# Patient Record
Sex: FEMALE | Race: WHITE
[De-identification: names, ages, dates, MRNs, and addresses within clinical notes are randomized per-mention and may not be internally consistent; named-entity substitution may affect disease eponyms.]

---

## 2017-12-06 NOTE — RADRPT
EXAM DATE/TIME:  12/06/2017 16:17 

 

[facility] COMPARISON:     

No previous studies available for comparison.

 

                     

INDICATIONS :     

Abdominal pain and constipation.

                     

 

MEDICAL HISTORY :     

None.          

 

SURGICAL HISTORY :     

None.   

 

ENCOUNTER:     

Initial                                        

 

ACUITY:     

2 weeks      

 

PAIN SCORE:     

6/10

 

LOCATION:       

all quadrants.

 

FINDINGS:     

Supine and upright views of the abdomen were performed.  The abdominal bowel gas pattern is normal.  
No air fluid levels are seen.  No abnormal masses, calcifications, or organomegaly is seen.  The visu
alized lower lungs are clear.  No evidence of free intraperitoneal gas.  The osseous structures are u
nremarkable with a mild dextroscoliosis of the lumbar spine.

 

CONCLUSION:     

Radiographically benign abdomen.

 

 

 

 Chris Bishop MD on December 06, 2017 at 17:13           

Board Certified Radiologist.

 This report was verified electronically.

## 2017-12-06 NOTE — PD
HPI


Chief Complaint:  Cold / Flu Symptoms


Time Seen by Provider:  15:49


Travel History


International Travel<30 days:  No


Contact w/Intl Traveler<30days:  No


Traveled to known affect area:  No





History of Present Illness


HPI


32-year-old female presents to emergency department with multiple medical 

complaints today.  Patient states that she has "digestive issues" for about 3 

weeks and states her bowel habits have not been regular since then.  She is 

concerned because she also has some mild abdominal pain.  States that she had a 

"normal" bowel movement 1 week ago.  Also states that she had a bowel movement 

yesterday but did not feel like she can completely empty her bowels.  States 

she has had flatulence and burping.  Patient denies fever, chills, chest pain, 

shortness of breath.  Also states that she has had increased urination possibly 

secondary to the constipation.  Patient says that she has used MiraLAX and over-

the-counter stool softeners without relief.  Of note patient was vegan for 

several months and she also believes that this may be contributing to her 

constipation.  


She is also complaining of lower lumbar pain after moving that occurred about 1 

month ago  after moving.  She thinks she has a "bulging disc".  Her pain is 

mainly located in the lower lumbar spine. Patient denies numbness, tingling, 

saddle anesthesia, loss of bowel or bladder function, IV drug use, fever.


She is concerned about upper respiratory infection that she may have as well.  

Patient states she has a cough with clear mucus, mild sore throat, and 

rhinorrhea.  She denies nausea, vomiting, diarrhea





PFSH


Past Medical History


Asthma:  Yes


Diminished Hearing:  No


Musculoskeletal:  Yes (chronic neck and back pain)


Pregnant?:  Not Pregnant


:  1


Para:  1


Miscarriage:  0


:  0





Social History


Alcohol Use:  No


Tobacco Use:  No


Substance Use:  No





Allergies-Medications


(Allergen,Severity, Reaction):  


Coded Allergies:  


     No Known Allergies (Verified  Adverse Reaction, Unknown, 17)


Reported Meds & Prescriptions





Reported Meds & Active Scripts


Active


Macrobid (Nitrofurantoin Monoh/Nitrofur Macro) 100 Mg Cap 100 Mg PO BID 7 Days


Reported


Miralax Powder (Polyethylene Glycol 3350 Powder) 17 Gm Powd 17 Gm PO DAILY


     Mix and dissolve one measuring cap-ful (17 grams) in water or juice.


Alprazolam 0.5 Mg Tab 0.5 Mg PO Q8H PRN


Adderall Xr 24 HR (Amphetamine/Dextroamphetamine) 30 Mg Cap 30 Mg PO DAILY


     Once daily in the morning.








Review of Systems


Except as stated in HPI:  all other systems reviewed are Neg





Physical Exam


Narrative


GENERAL: Well developed well nourished in no apparent distress


SKIN: Focused skin assessment warm/dry.


HEAD: Atraumatic. Normocephalic. 


EYES: Pupils equal and round. No scleral icterus. No injection or drainage. 


ENT: No nasal bleeding or discharge.  Mucous membranes pink and moist.


NECK: Trachea midline. No JVD. 


CARDIOVASCULAR: Regular rate and rhythm.  No murmur appreciated.


RESPIRATORY: No accessory muscle use. Clear to auscultation. Breath sounds 

equal bilaterally. 


GASTROINTESTINAL: Abdomen soft, nondistended. Hepatic and splenic margins not 

palpable.  Abdomen diffusely tender without rebound tenderness or ecchymosis.


MUSCULOSKELETAL: No obvious deformities. No clubbing.  No cyanosis.  No edema.  

No midline tenderness.


NEUROLOGICAL: Awake and alert. No obvious cranial nerve deficits.  Motor 

grossly within normal limits. Normal speech.


PSYCHIATRIC: Appropriate mood and affect; insight and judgment normal.





Data


Data


Last Documented VS





Vital Signs








  Date Time  Temp Pulse Resp B/P (MAP) Pulse Ox O2 Delivery O2 Flow Rate FiO2


 


17 15:29 98.3 90 16 115/79 (91) 99   








Orders





 Orders


Urinalysis - C+S If Indicated (17 15:50)


Abdomen, Flat & Upright (17 )


Urine Culture (17 14:30)


Ed Discharge Order (17 17:29)





Labs





Laboratory Tests








Test


  17


14:30


 


Urine Color YELLOW 


 


Urine Turbidity SLIGHT 


 


Urine pH 6.0 


 


Urine Specific Gravity 1.025 


 


Urine Protein NEG mg/dL 


 


Urine Glucose (UA) NEG mg/dL 


 


Urine Ketones TRACE mg/dL 


 


Urine Occult Blood NEG 


 


Urine Nitrite POS 


 


Urine Bilirubin NEG 


 


Urine Leukocyte Esterase MOD 


 


Urine RBC 0-3 /hpf 


 


Urine WBC 9-14 /hpf 


 


Urine Squamous Epithelial


Cells 6-8 /hpf 


 


 


Urine Bacteria MANY /hpf 


 


Urine Mucus MOD /lpf 


 


Microscopic Urinalysis Comment


  CULTURE


INDICATED











MDM


Medical Decision Making


Medical Screen Exam Complete:  Yes


Emergency Medical Condition:  Yes


Differential Diagnosis


Lumbar strain versus fracture versus sprain


Constipation versus obstipation versus medication discussed patient


Upper respiratory infection versus bronchitis versus, cold


Narrative Course


32-year-old female presents to emergency department with multiple medical 

complaints today.  Patient states that she has "digestive issues" for about 3 

weeks and states her bowel habits have not been regular since then.  She is 

concerned because she also has some mild abdominal pain.  States that she had a 

"normal" bowel movement 1 week ago.  Also states that she had a bowel movement 

yesterday but did not feel like she can completely empty her bowels.  States 

she has had flatulence and burping.  Patient denies fever, chills, chest pain, 

shortness of breath.  Also states that she has had increased urination possibly 

secondary to the constipation.  Patient says that she has used MiraLAX and over-

the-counter stool softeners without relief.  Denies melena or hematochezia.  Of 

note patient was vegan for several months and she also believes that this may 

be contributing to her constipation.  


She is also complaining of lower lumbar pain after moving that occurred about 1 

month ago  after moving.  She thinks she has a "bulging disc".  Her pain is 

mainly located in the lower lumbar spine. Patient denies numbness, tingling, 

saddle anesthesia, loss of bowel or bladder function, IV drug use, fever.


She is concerned about upper respiratory infection that she may have as well.  

Patient states she has a cough with clear mucus, mild sore throat, and 

rhinorrhea.  She denies nausea, vomiting, diarrhea


Vital signs stable.


Based off of history and my physical exam findings her symptoms are most 

explained by urinary tract infection.  She may have some obstipation however, 

it is best left to outpatient therapy with gastroenterologist.  I explained 

this to patient.


Patient will take Macrobid for her urinary symptoms. 


She may also have a mild lumbar strain however, it is more likely that she has 

back pain associated with a urinary tract infection.


In addition her upper respiratory symptoms appear viral.  No evidence of 

bacterial infection and all symptoms are mild at this point.


Follow-up with primary care physician within 2-3 days.


For symptoms persist or worsen return to the emergency department.





Diagnosis





 Primary Impression:  


 Urinary tract infection


 Qualified Codes:  N30.00 - Acute cystitis without hematuria


 Additional Impression:  


 Upper respiratory infection


 Qualified Codes:  J06.9 - Acute upper respiratory infection, unspecified; 

B97.89 - Other viral agents as the cause of diseases classified elsewhere


Referrals:  


[facility]





***Additional Instructions:  


Follow-up a primary care physician within 2-3 days.


Scripts


Nitrofurantoin Monohydrate Macrocrystals (Macrobid) 100 Mg Cap


100 MG PO BID for Infection for 7 Days, #14 CAP 0 Refills


   Prov: Arturo Lawrence MD         17


Disposition:  01 DISCHARGE HOME


Condition:  Stable











Annette Nicholson Dec 6, 2017 15:54

## 2018-06-07 ENCOUNTER — HOSPITAL ENCOUNTER (EMERGENCY)
Dept: HOSPITAL 17 - PHED | Age: 33
Discharge: HOME | End: 2018-06-07
Payer: COMMERCIAL

## 2018-06-07 VITALS — RESPIRATION RATE: 20 BRPM | OXYGEN SATURATION: 100 % | HEART RATE: 80 BPM | TEMPERATURE: 99.1 F

## 2018-06-07 DIAGNOSIS — S00.93XA: Primary | ICD-10-CM

## 2018-06-07 DIAGNOSIS — F43.10: ICD-10-CM

## 2018-06-07 DIAGNOSIS — S50.01XA: ICD-10-CM

## 2018-06-07 DIAGNOSIS — G89.29: ICD-10-CM

## 2018-06-07 DIAGNOSIS — S89.91XA: ICD-10-CM

## 2018-06-07 DIAGNOSIS — M54.2: ICD-10-CM
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
## 2018-06-07 DIAGNOSIS — M54.9: ICD-10-CM
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
## 2018-06-07 PROCEDURE — 99283 EMERGENCY DEPT VISIT LOW MDM: CPT

## 2018-06-07 NOTE — PD
HPI


Chief Complaint:  Injury


Time Seen by Provider:  11:28


Travel History


International Travel<30 days:  No


Contact w/Intl Traveler<30days:  No


Traveled to known affect area:  No





History of Present Illness


HPI


33-year-old female presents to the emergency room for evaluation of head injury

, right elbow injury, and right knee injury that occurred just prior to arrival 

at work.  Patient states she was walking through large steel [place] at work when 

the kicked back and hit her in the head, elbow, and knee.  She denies loss of 

consciousness.  She does report mild frontal headache and feeling dizzy and 

dazed after being hit in the head.  Denies significant pain to the knee or 

elbow.  States she has been using both her upper and lower extremity without 

difficulties.  No paresthesias.  No weakness.  She went to an urgent care and 

they recommended she come to the emergency room for a CAT scan because she had 

a bruise on her head in [place] that she felt dizzy.  Patient states since 

arriving here she has improved.  She is not on blood thinners.  She has had no 

nausea or vomiting.  She only has history of PTSD for which she takes Xanax





PFSH


Past Medical History


Asthma:  Yes


Diminished Hearing:  No


Musculoskeletal:  Yes (chronic neck and back pain)


:  1


Para:  1


Miscarriage:  0


:  0





Social History


Alcohol Use:  No


Tobacco Use:  No


Substance Use:  No





Allergies-Medications


(Allergen,Severity, Reaction):  


Coded Allergies:  


     No Known Allergies (Verified  Adverse Reaction, Unknown, 17)


Reported Meds & Prescriptions





Reported Meds & Active Scripts


Active


Macrobid (Nitrofurantoin Monoh/Nitrofur Macro) 100 Mg Cap 100 Mg PO BID 7 Days


Reported


Miralax Powder (Polyethylene Glycol 3350 Powder) 17 Gm Powd 17 Gm PO DAILY


     Mix and dissolve one measuring cap-ful (17 grams) in water or juice.


Alprazolam 0.5 Mg Tab 0.5 Mg PO Q8H PRN


Adderall Xr 24 HR (Amphetamine/Dextroamphetamine) 30 Mg Cap 30 Mg PO DAILY


     Once daily in the morning.








Review of Systems


Except as stated in HPI:  all other systems reviewed are Neg





Physical Exam


Narrative


GENERAL: Well-nourished, well-developed female no acute distress.  Afebrile.  

Ambulatory.


SKIN: Focused skin assessment warm/dry.  Mild 2 similar area of ecchymosis to 

the right forehead.  Mild ecchymosis on the right elbow.  No long sign or 

raccoon eyes.


HEAD: Normocephalic.


EYES: PERRL, EOMI, no discharge or injection. No scleral icterus.


ENT: Mucosa pink and moist. No erythema or exudates. No uvular edema. No uvular

, palatal, or tonsillar deviation. Airway patent. Nasal turbinates appear 

normal without nasal blood, purulent drainage or septal hematoma.


EARS: Bilateral pinnae and external canals appear within normal limits. 

Bilateral tympanic membranes without erythema, dullness or perforation.  No 

hemotympanum.


NECK: Supple, trachea midline. No JVD or lymphadenopathy.


CARDIOVASCULAR: Regular rate and rhythm without murmurs, gallops, or rubs. 


RESPIRATORY: Breath sounds equal bilaterally. No accessory muscle use.


MUSCULOSKELETAL: No cyanosis, or edema.  Full range motion of bilateral upper 

and lower extremities.  Strength 5/5 and equal bilaterally upper and lower 

extremities.


NEUROLOGICAL: Awake and alert. Cranial nerves II through XII intact.  Motor and 

sensory grossly within normal limits. Five out of 5 muscle strength in all 

muscle groups.  Normal speech.  No pronator drift in upper or lower extremities.





Data


Data


Orders





 Orders


Ice/Cold Pack (18 11:39)








MDM


Medical Decision Making


Medical Screen Exam Complete:  Yes


Emergency Medical Condition:  Yes


Medical Record Reviewed:  Yes


Differential Diagnosis


Concussion, contusion, intracranial hemorrhage unlikely


Narrative Course


33-year-old female presents to the emergency room for evaluation of head, elbow

, and knee injury that occurred just prior to arrival at work.  Patient 

accidentally ran into a large steel door with her head, elbow, and knee.  There 

was no loss of consciousness.  She has had no nausea or vomiting.  She is mild, 

frontal headache.  States she went to an urgent care and they recommended she 

come to the emergency room for a CT of her brain because she felt dazed and a 

little dizzy.  States the symptoms have since improved.  She has no focal 

neurological deficits.  She does have a small contusion to the right forehead.  

She is not on blood thinners.  [place] CT rule excludes need for imaging at 

this time.  Patient was given ice pack for her head.  She was told to take 

Tylenol Motrin for pain.  Told to follow-up with a primary care physician or 

return for worsening symptoms.  She understands and agrees to plan.





Diagnosis





 Primary Impression:  


 Contusion of head


 Qualified Codes:  S00.83XA - Contusion of other part of head, initial encounter


 Additional Impression:  


 Elbow contusion


 Qualified Codes:  S50.01XA - Contusion of right elbow, initial encounter


Referrals:  


Primary Care Physician





***Additional Instructions:  


Rest and drink plenty of fluids.


Take ibuprofen with food as directed, as needed for pain.


Apply ice to the affected area for 20 minutes at a time, as needed for pain and 

swelling.


Follow-up with a primary care physician.


Return to the emergency room for worsening symptoms.


***Med/Other Pt SpecificInfo:  Prescription(s) given


Disposition:  01 DISCHARGE HOME


Condition:  Stable











Shira Mansfield 2018 11:50